# Patient Record
Sex: MALE | Race: WHITE | NOT HISPANIC OR LATINO | Employment: UNEMPLOYED | URBAN - METROPOLITAN AREA
[De-identification: names, ages, dates, MRNs, and addresses within clinical notes are randomized per-mention and may not be internally consistent; named-entity substitution may affect disease eponyms.]

---

## 2020-08-24 ENCOUNTER — OFFICE VISIT (OUTPATIENT)
Dept: CARDIAC SURGERY | Facility: CLINIC | Age: 55
End: 2020-08-24

## 2020-08-24 VITALS
HEART RATE: 101 BPM | BODY MASS INDEX: 42.66 KG/M2 | TEMPERATURE: 99.1 F | WEIGHT: 315 LBS | OXYGEN SATURATION: 97 % | HEIGHT: 72 IN | DIASTOLIC BLOOD PRESSURE: 65 MMHG | SYSTOLIC BLOOD PRESSURE: 110 MMHG

## 2020-08-24 DIAGNOSIS — I73.9 PAD (PERIPHERAL ARTERY DISEASE) (HCC): Primary | ICD-10-CM

## 2020-08-24 PROCEDURE — 99204 OFFICE O/P NEW MOD 45 MIN: CPT | Performed by: THORACIC SURGERY (CARDIOTHORACIC VASCULAR SURGERY)

## 2020-08-24 RX ORDER — WARFARIN SODIUM 2 MG/1
TABLET ORAL DAILY
COMMUNITY
Start: 2020-06-11

## 2020-08-24 RX ORDER — ASPIRIN 81 MG/1
TABLET ORAL DAILY
COMMUNITY
Start: 2020-06-11

## 2020-08-24 RX ORDER — SIMVASTATIN 40 MG
TABLET ORAL
COMMUNITY
Start: 2020-06-11

## 2020-08-24 RX ORDER — FOLIC ACID 1 MG/1
TABLET ORAL DAILY
COMMUNITY
Start: 2020-06-11

## 2020-08-24 RX ORDER — WARFARIN SODIUM 6 MG/1
TABLET ORAL DAILY
COMMUNITY
Start: 2020-06-11

## 2020-08-24 RX ORDER — DOCUSATE SODIUM 100 MG/1
1 CAPSULE, LIQUID FILLED ORAL
COMMUNITY
Start: 2020-06-11

## 2020-08-24 RX ORDER — GABAPENTIN 600 MG/1
TABLET ORAL 3 TIMES DAILY
COMMUNITY
Start: 2020-06-30

## 2020-08-24 RX ORDER — AMITRIPTYLINE HYDROCHLORIDE 50 MG/1
TABLET, FILM COATED ORAL
COMMUNITY
Start: 2020-06-11

## 2020-08-24 RX ORDER — TRAZODONE HYDROCHLORIDE 100 MG/1
TABLET ORAL
COMMUNITY
Start: 2020-06-11

## 2020-08-24 RX ORDER — FLUTICASONE PROPIONATE 50 MCG
SPRAY, SUSPENSION (ML) NASAL
COMMUNITY
Start: 2020-02-18

## 2020-08-24 RX ORDER — SEVELAMER CARBONATE 800 MG/1
TABLET, FILM COATED ORAL 3 TIMES DAILY
COMMUNITY
Start: 2019-09-27

## 2020-08-24 RX ORDER — SWAB
1 SWAB, NON-MEDICATED MISCELLANEOUS
COMMUNITY
Start: 2020-06-11

## 2020-08-24 NOTE — PROGRESS NOTES
08/24/2020  Patient Information  Rodrigue Nieslen                                                                                          [address]  [address]  [place] [address]   1965  'PCP/Referring Physician'  Stephanie Ricketts, APRN  [phone]  No ref. provider found    Chief Complaint   Patient presents with   • Peripheral Vascular Disease     Referred by MIA Shea for PVD. Patient states when laying in bed his left leg gets very cold.        History of Present Illness:   This patient is referred to me to evaluate peripheral arterial vascular disease.  He states that his feet get cold at night, especially his left leg and he has had a partial right great toe and second toe amputation.  The patient has significant morbid obesity and has been on hemodialysis for over 10 years and has class IV venous stasis disease of the lower extremities bilaterally.  His walking is limited primarily by shortness of breath and not true claudication symptoms.      Patient Active Problem List   Diagnosis   • PAD (peripheral artery disease) (CMS/HCC)     Past Medical History:   Diagnosis Date   • Coronary artery disease    • Deep vein thrombosis (CMS/HCC)    • Depression    • Diabetes mellitus (CMS/HCC)    • Dialysis patient (CMS/HCC)    • Hyperlipidemia    • Hypertension    • Peripheral vascular disease (CMS/HCC)    • Pulmonary embolism (CMS/HCC)    • Renal failure      Past Surgical History:   Procedure Laterality Date   • COLONOSCOPY W/ POLYPECTOMY     • CORONARY STENT PLACEMENT     • DIALYSIS FISTULA CREATION     • EYE SURGERY Right    • PERIPHERAL ARTERIAL STENT GRAFT Right    • TOE AMPUTATION Right        Current Outpatient Medications:   •  amitriptyline (ELAVIL) 50 MG tablet, Take  by mouth., Disp: , Rfl:   •  aspirin 81 MG EC tablet, Take  by mouth Daily., Disp: , Rfl:   •  Calcium Acetate 668 (169 Ca) MG tablet, Take  by mouth Daily., Disp: , Rfl:   •  Cetirizine HCl 10 MG capsule, Take  by mouth.,  Disp: , Rfl:   •  docusate sodium (Colace) 100 MG capsule, Take 1 capsule by mouth., Disp: , Rfl:   •  fluticasone (FLONASE) 50 MCG/ACT nasal spray, into the nostril(s) as directed by provider., Disp: , Rfl:   •  folic acid (FOLVITE) 1 MG tablet, Take  by mouth Daily., Disp: , Rfl:   •  gabapentin (NEURONTIN) 600 MG tablet, Take  by mouth 3 (Three) Times a Day., Disp: , Rfl:   •  insulin degludec (Tresiba FlexTouch) 100 UNIT/ML solution pen-injector injection, Inject  under the skin into the appropriate area as directed., Disp: , Rfl:   •  Omega-3 Fatty Acids (FISH OIL CONCENTRATE) 1000 MG capsule, Take 1 capsule by mouth., Disp: , Rfl:   •  sevelamer (Renvela) 800 MG tablet, Take  by mouth 3 (Three) Times a Day., Disp: , Rfl:   •  simvastatin (ZOCOR) 40 MG tablet, Take  by mouth., Disp: , Rfl:   •  traZODone (DESYREL) 100 MG tablet, Take  by mouth., Disp: , Rfl:   •  warfarin (COUMADIN) 2 MG tablet, Take  by mouth Daily., Disp: , Rfl:   •  warfarin (COUMADIN) 6 MG tablet, Take  by mouth Daily., Disp: , Rfl:   Allergies   Allergen Reactions   • Diazepam Mental Status Change     Social History     Socioeconomic History   • Marital status: Single     Spouse name: Not on file   • Number of children: 0   • Years of education: Not on file   • Highest education level: Not on file   Occupational History     Employer: DISABLED   Tobacco Use   • Smoking status: Current Every Day Smoker     Packs/day: 1.00     Years: 38.00     Pack years: 38.00     Types: Cigarettes   • Smokeless tobacco: Never Used   Substance and Sexual Activity   • Alcohol use: Never     Frequency: Never   • Drug use: Yes     Types: Marijuana     Comment: rarely    Social History Narrative    Lives in [place], [place] alone      Family History   Problem Relation Age of Onset   • Stroke Father      Review of Systems   Constitution: Positive for malaise/fatigue. Negative for chills, fever, night sweats and weight loss.   HENT: Negative for hearing loss,  "odynophagia and sore throat.    Cardiovascular: Positive for chest pain (when laying flat) and leg swelling. Negative for dyspnea on exertion, orthopnea and palpitations.   Respiratory: Positive for cough and wheezing. Negative for hemoptysis.    Endocrine: Negative for cold intolerance, heat intolerance, polydipsia, polyphagia and polyuria.   Hematologic/Lymphatic: Does not bruise/bleed easily.   Skin: Negative for itching and rash.   Musculoskeletal: Positive for back pain and joint pain. Negative for joint swelling and myalgias.   Gastrointestinal: Negative for abdominal pain, constipation, diarrhea, hematemesis, hematochezia, melena, nausea and vomiting.   Genitourinary: Negative for dysuria, frequency and hematuria.   Neurological: Positive for loss of balance. Negative for focal weakness, headaches, numbness and seizures.   Psychiatric/Behavioral: Positive for depression. Negative for suicidal ideas. The patient is not nervous/anxious.    All other systems reviewed and are negative.    Vitals:    08/24/20 0806   BP: 110/65   BP Location: Right arm   Patient Position: Sitting   Pulse: 101   Temp: 99.1 °F (37.3 °C)   SpO2: 97%   Weight: (!) 181 kg (400 lb)   Height: 182.9 cm (72\")      Physical Exam   CONSTITUTIONAL: Alert and conversant, significant morbid obesity with large pannus  EYES: Sclera clean, Anicteric, Pupils equal  ENT: No nasal deviation, Trachea midline  NECK: No neck masses, Supple  LUNGS: No wheezing, Cough, non-congested  HEART: No rubs, No murmurs  GASTROINTESTINAL: Soft, non-distended, No masses, significant obesity with large pannus nEURO: No motor deficits, No sensory deficits, Cranial Nerves 2 through 12 grossly intact  PSYCHIATRIC: Oriented to person, place and time, No memory deficits, Mood appropriate  VASCULAR: No carotid bruits, significant venous stasis disease class IV bilaterally.  Partial right great and second toe amputation.  Good quality Doppler signals in the posterior tibial " arteries bilaterally and in the left dorsalis pedis artery I am unable to detect a Doppler signal in the dorsalis pedis on the right  MUSKULOSKELETAL: No extremity trauma or extremity asymmetry    index finger and thumb on the right hand stained yellow with nicotine    Labs/Imaging:  I have reviewed the LUIS from an outside facility.     Assessment/Plan:   This patient was referred to me to evaluate arterial insufficiency.  He has morbid obesity with a large pannus and has been on hemodialysis for over 9 or 10 years.  Unfortunately, he still is continuing to smoke.  Within the last few years he has had a toe amputation of the right great and second toe.  These were able to heal without difficulty.  Currently, his venous stasis disease is a significant problem.  He has morbid obesity with a very large pannus which is also obstructing the outflow from his lower extremities, as well as his fluid retention from hemodialysis.  However, his arterial signals in his posterior tibial arteries are adequate.  No further arterial surgery is needed at this time. The patient is still at high risk of loss of toes secondary to venous disease and his continued smoking is also a problem.  But as of now ,no surgical intervention is needed. He would also be an extremely poor candidate for surgical intervention.    Patient Active Problem List   Diagnosis   • PAD (peripheral artery disease) (CMS/[facility])     CC: MIA Carvajal editing for Nicolas Schaefer MD    I, Nicolas Schaefer MD, have read and agree with the editing done by Khushbu Wise, .